# Patient Record
Sex: MALE | Race: WHITE | NOT HISPANIC OR LATINO | Employment: FULL TIME | URBAN - METROPOLITAN AREA
[De-identification: names, ages, dates, MRNs, and addresses within clinical notes are randomized per-mention and may not be internally consistent; named-entity substitution may affect disease eponyms.]

---

## 2021-05-17 ENCOUNTER — HISTORICAL (OUTPATIENT)
Dept: RADIOLOGY | Facility: HOSPITAL | Age: 21
End: 2021-05-17

## 2023-03-12 ENCOUNTER — HOSPITAL ENCOUNTER (EMERGENCY)
Facility: HOSPITAL | Age: 23
Discharge: HOME | End: 2023-03-12
Attending: INTERNAL MEDICINE
Payer: COMMERCIAL

## 2023-03-12 VITALS
OXYGEN SATURATION: 96 % | HEART RATE: 102 BPM | SYSTOLIC BLOOD PRESSURE: 148 MMHG | DIASTOLIC BLOOD PRESSURE: 81 MMHG | WEIGHT: 155 LBS | TEMPERATURE: 99 F | HEIGHT: 71 IN | BODY MASS INDEX: 21.7 KG/M2 | RESPIRATION RATE: 20 BRPM

## 2023-03-12 DIAGNOSIS — L70.9 ACNE, UNSPECIFIED ACNE TYPE: Primary | ICD-10-CM

## 2023-03-12 PROCEDURE — 99283 EMERGENCY DEPT VISIT LOW MDM: CPT

## 2023-03-12 RX ORDER — DOXYCYCLINE 100 MG/1
100 CAPSULE ORAL EVERY 12 HOURS
Qty: 20 CAPSULE | Refills: 0 | Status: SHIPPED | OUTPATIENT
Start: 2023-03-12 | End: 2023-03-22

## 2023-03-13 NOTE — ED PROVIDER NOTES
Source of History:  Patient, no limitations    Chief complaint:  Exposure to STD (Pt here with c/o possibly being exposed to herpes. He states he started noticing some bumps around his mouth x3 mnths ago. Pt denies any penile discharge, wounds, rash, painful urination, just the complaint about breakouts around his face. Denies sore in mouth. States has not been sexuaqly active since.)      HPI:  Gunner Reid is a 22 y.o. male presenting with Exposure to STD (Pt here with c/o possibly being exposed to herpes. He states he started noticing some bumps around his mouth x3 mnths ago. Pt denies any penile discharge, wounds, rash, painful urination, just the complaint about breakouts around his face. Denies sore in mouth. States has not been sexuaqly active since.)     Patient presents for sexually transmitted disease check. Sexual history reviewed with the patient. STD exposure: concern of possible oral herpes.  Previous history of STD:  none. Current symptoms include none.  Contraception:  denies recent intercourse, only concerned of oral STD .        Review of Systems   Constitutional symptoms:  Negative except as documented in HPI.   Skin symptoms:  Negative except as documented in HPI.   HEENT symptoms:  Negative except as documented in HPI.   Respiratory symptoms:  Negative except as documented in HPI.   Cardiovascular symptoms:  Negative except as documented in HPI.   Gastrointestinal symptoms:  Negative except as documented in HPI.    Genitourinary symptoms:  Negative except as documented in HPI.   Musculoskeletal symptoms:  Negative except as documented in HPI.   Neurologic symptoms:  Negative except as documented in HPI.   Psychiatric symptoms:  Negative except as documented in HPI.   Allergy/immunologic symptoms:  Negative except as documented in HPI.             Additional review of systems information: All other systems reviewed and otherwise negative.      Review of patient's allergies indicates:  No  "Known Allergies    PMH:  As per HPI and below:    Past Medical History:   Diagnosis Date    Anxiety disorder, unspecified     Attention-deficit hyperactivity disorder, unspecified type     Insomnia     Major depressive disorder, single episode, unspecified         No family history on file.    History reviewed. No pertinent surgical history.    Social History     Tobacco Use    Smoking status: Every Day     Types: Cigarettes, Vaping with nicotine    Smokeless tobacco: Never   Substance Use Topics    Alcohol use: Not Currently     Alcohol/week: 4.0 standard drinks     Types: 4 Standard drinks or equivalent per week    Drug use: Yes     Types: Marijuana     Comment: psychadelics       There is no problem list on file for this patient.       Physical Exam:    BP (!) 148/81 (BP Location: Left arm, Patient Position: Sitting)   Pulse 102   Temp 99.1 °F (37.3 °C) (Oral)   Resp 20   Ht 5' 11" (1.803 m)   Wt 70.3 kg (155 lb)   SpO2 96%   BMI 21.62 kg/m²     Nursing note and vital signs reviewed.    General:  Alert, no acute distress.   Skin: Normal for Ethnic Origin, No cyanosis, facial acne  HEENT: Normocephalic and atraumatic, Vision unchanged, Pupils symmetric, No icterus , Nasal mucosa is pink and moist  Cardiovascular:  Regular rate and rhythm, No edema  Chest Wall: No deformity, equal chest rise  Respiratory:  Lungs are clear to auscultation, respirations are non-labored.    Musculoskeletal:  No deformity, Normal perfusion to all extremities  Gastrointestinal:  Soft, Non distended  Neurological:  Alert and oriented, normal motor observed, normal speech observed.    Psychiatric:  Cooperative, appropriate mood & affect.        Labs that have been ordered have been independently reviewed and interpreted by myself.     Old Chart Reviewed.      Initial Impression/ Differential Dx:  STI, urethritis, testicular/appendix torsion, epididymitis, orchitis, UTI, kidney stone, urinary retention, appendicitis, prostatitis, " testicular mass/neoplasm, incarcerated/strangulated hernia.      MDM:      Reviewed Nurses Note.    Reviewed Pertinent old records.    Orders Placed This Encounter    doxycycline (MONODOX) 100 MG capsule                    Labs Reviewed - No data to display         No orders to display        No visits with results within 1 Day(s) from this visit.   Latest known visit with results is:   Lab Requisition on 10/19/2022   Component Date Value Ref Range Status    WBC 10/19/2022 10.0  4.5 - 11.5 x10(3)/mcL Final    RBC 10/19/2022 5.04  4.70 - 6.10 x10(6)/mcL Final    Hgb 10/19/2022 14.6  14.0 - 18.0 gm/dL Final    Hct 10/19/2022 45.1  42.0 - 52.0 % Final    Platelet 10/19/2022 271  130 - 400 x10(3)/mcL Final    MCV 10/19/2022 89.5  80.0 - 94.0 fL Final    MCH 10/19/2022 29.0  27.0 - 31.0 pg Final    MCHC 10/19/2022 32.4 (L)  33.0 - 36.0 mg/dL Final    RDW 10/19/2022 12.7  11.5 - 17.0 % Final    MPV 10/19/2022 9.9  7.4 - 10.4 fL Final    Sodium Level 10/19/2022 142  136 - 145 mmol/L Final    Potassium Level 10/19/2022 3.7  3.5 - 5.1 mmol/L Final    Chloride 10/19/2022 106  98 - 107 mmol/L Final    Carbon Dioxide 10/19/2022 26  22 - 29 mmol/L Final    Glucose Level 10/19/2022 95  74 - 100 mg/dL Final    Blood Urea Nitrogen 10/19/2022 11.1  8.9 - 20.6 mg/dL Final    Creatinine 10/19/2022 1.00  0.73 - 1.18 mg/dL Final    Calcium Level Total 10/19/2022 9.0  8.4 - 10.2 mg/dL Final    Protein Total 10/19/2022 6.1 (L)  6.4 - 8.3 gm/dL Final    Albumin Level 10/19/2022 3.1 (L)  3.5 - 5.0 gm/dL Final    Globulin 10/19/2022 3.0  2.4 - 3.5 gm/dL Final    Albumin/Globulin Ratio 10/19/2022 1.0 (L)  1.1 - 2.0 ratio Final    Bilirubin Total 10/19/2022 0.5  <=1.5 mg/dL Final    Alkaline Phosphatase 10/19/2022 79  40 - 150 unit/L Final    Alanine Aminotransferase 10/19/2022 10  0 - 55 unit/L Final    Aspartate Aminotransferase 10/19/2022 13  5 - 34 unit/L Final    eGFR 10/19/2022 >60  mls/min/1.73/m2 Final    Cholesterol Total 10/19/2022  123  <=200 mg/dL Final    HDL Cholesterol 10/19/2022 41  35 - 60 mg/dL Final    Triglyceride 10/19/2022 62  34 - 140 mg/dL Final    Cholesterol/HDL Ratio 10/19/2022 3  0 - 5 Final    Very Low Density Lipoprotein 10/19/2022 12   Final    LDL Cholesterol 10/19/2022 70.00  50.00 - 140.00 mg/dL Final    Thyroid Stimulating Hormone 10/19/2022 1.6800  0.3500 - 4.9400 uIU/mL Final    Amphetamines, Urine 10/19/2022 Negative  Negative Final    Barbituates, Urine 10/19/2022 Negative  Negative Final    Benzodiazepine, Urine 10/19/2022 Negative  Negative Final    Cannabinoids, Urine 10/19/2022 Positive (A)  Negative Final    Cocaine, Urine 10/19/2022 Negative  Negative Final    Opiates, Urine 10/19/2022 Negative  Negative Final    Phencyclidine, Urine 10/19/2022 Negative  Negative Final    pH, Urine 10/19/2022 7.5  3.0 - 11.0 Final    Specific Gravity, Urine Auto 10/19/2022 1.020  1.001 - 1.035 Final    Color, UA 10/19/2022 Yellow  Yellow, Light-Yellow, Dark Yellow, Claudia, Straw Final    Appearance, UA 10/19/2022 Clear  Clear Final    Specific Gravity, UA 10/19/2022 1.020   Final    pH, UA 10/19/2022 7.5  5.0, 5.5, 6.0, 6.5, 7.0, 7.5, 8.0, 8.5 Final    Protein, UA 10/19/2022 Negative  Negative mg/dL Final    Glucose, UA 10/19/2022 Negative  Negative, Normal mg/dL Final    Ketones, UA 10/19/2022 Negative  Negative mg/dL Final    Blood, UA 10/19/2022 Negative  Negative unit/L Final    Bilirubin, UA 10/19/2022 Negative  Negative mg/dL Final    Urobilinogen, UA 10/19/2022 0.2  0.2, 1.0, Normal mg/dL Final    Nitrites, UA 10/19/2022 Negative  Negative Final    Leukocyte Esterase, UA 10/19/2022 Negative  Negative unit/L Final    Bacteria, UA 10/19/2022 None Seen  None Seen, Rare, Occasional /HPF Final    RBC, UA 10/19/2022 0-2  None Seen, 0-2, 3-5, 0-5, >100 /HPF Final    WBC, UA 10/19/2022 0-2  None Seen, 0-2, 3-5, 0-5 /HPF Final    Squamous Epithelial Cells, UA 10/19/2022 None Seen  None Seen, Rare, Occasional, Occ /HPF Final        Imaging Results    None                                              Diagnostic Impression:    1. Acne, unspecified acne type         ED Disposition Condition    Discharge Stable             Follow-up Information       [facility] - Emergency Dept.    Specialty: Emergency Medicine  Why: If symptoms worsen  Contact information:  [address]  [facility] [address]  [phone]                            ED Prescriptions       Medication Sig Dispense Start Date End Date Auth. Provider    doxycycline (MONODOX) 100 MG capsule Take 1 capsule (100 mg total) by mouth every 12 (twelve) hours. for 10 days 20 capsule 3/12/2023 3/22/2023 Myron Patel, DO          Follow-up Information       Follow up With Specialties Details Why Contact Info    [facility] - Emergency Dept Emergency Medicine  If symptoms worsen [address]  [facility] [address]  [phone]             Myron Patel DO  03/13/23 0055